# Patient Record
Sex: MALE | Race: WHITE | Employment: FULL TIME | URBAN - METROPOLITAN AREA
[De-identification: names, ages, dates, MRNs, and addresses within clinical notes are randomized per-mention and may not be internally consistent; named-entity substitution may affect disease eponyms.]

---

## 2019-01-02 ENCOUNTER — OFFICE VISIT (OUTPATIENT)
Dept: FAMILY MEDICINE CLINIC | Facility: CLINIC | Age: 49
End: 2019-01-02
Payer: COMMERCIAL

## 2019-01-02 VITALS
HEIGHT: 71.46 IN | TEMPERATURE: 97 F | HEART RATE: 69 BPM | SYSTOLIC BLOOD PRESSURE: 127 MMHG | WEIGHT: 196.63 LBS | DIASTOLIC BLOOD PRESSURE: 78 MMHG | BODY MASS INDEX: 26.93 KG/M2

## 2019-01-02 DIAGNOSIS — Z00.00 ANNUAL PHYSICAL EXAM: Primary | ICD-10-CM

## 2019-01-02 DIAGNOSIS — R06.83 SNORING: ICD-10-CM

## 2019-01-02 PROCEDURE — 90471 IMMUNIZATION ADMIN: CPT | Performed by: FAMILY MEDICINE

## 2019-01-02 PROCEDURE — 90686 IIV4 VACC NO PRSV 0.5 ML IM: CPT | Performed by: FAMILY MEDICINE

## 2019-01-02 PROCEDURE — 99396 PREV VISIT EST AGE 40-64: CPT | Performed by: FAMILY MEDICINE

## 2019-01-02 NOTE — PROGRESS NOTES
HPI:    Rosenda Freeman is a 50year old male for an annual physical exam.   Wife has been complaining that he snores more at night. Sleeps between 6-8 hours a night, denies daytime somnolence, feels well rested. Normal appetite, balanced diet.  Normal normal.   Mouth/Throat: Uvula is midline, oropharynx is clear and moist and mucous membranes are normal.   Eyes: Conjunctivae and EOM are normal. Pupils are equal, round, and reactive to light. Neck: Normal range of motion. Neck supple.  No thyromegaly pr

## 2019-01-03 LAB
ABSOLUTE BASOPHILS: 47 CELLS/UL (ref 0–200)
ABSOLUTE EOSINOPHILS: 189 CELLS/UL (ref 15–500)
ABSOLUTE LYMPHOCYTES: 1121 CELLS/UL (ref 850–3900)
ABSOLUTE MONOCYTES: 437 CELLS/UL (ref 200–950)
ABSOLUTE NEUTROPHILS: 4106 CELLS/UL (ref 1500–7800)
ALBUMIN/GLOBULIN RATIO: 2 (CALC) (ref 1–2.5)
ALBUMIN: 4.7 G/DL (ref 3.6–5.1)
ALKALINE PHOSPHATASE: 62 U/L (ref 40–115)
ALT: 21 U/L (ref 9–46)
AST: 14 U/L (ref 10–40)
BASOPHILS: 0.8 %
BILIRUBIN, TOTAL: 0.6 MG/DL (ref 0.2–1.2)
BUN: 17 MG/DL (ref 7–25)
CALCIUM: 9.4 MG/DL (ref 8.6–10.3)
CARBON DIOXIDE: 25 MMOL/L (ref 20–32)
CHLORIDE: 104 MMOL/L (ref 98–110)
CHOL/HDLC RATIO: 3.5 (CALC)
CHOLESTEROL, TOTAL: 173 MG/DL
CREATININE: 0.78 MG/DL (ref 0.6–1.35)
EGFR IF AFRICN AM: 124 ML/MIN/1.73M2
EGFR IF NONAFRICN AM: 107 ML/MIN/1.73M2
EOSINOPHILS: 3.2 %
GLOBULIN: 2.3 G/DL (CALC) (ref 1.9–3.7)
GLUCOSE: 85 MG/DL (ref 65–99)
HDL CHOLESTEROL: 50 MG/DL
HEMATOCRIT: 42.5 % (ref 38.5–50)
HEMOGLOBIN: 14.7 G/DL (ref 13.2–17.1)
LDL-CHOLESTEROL: 103 MG/DL (CALC)
LYMPHOCYTES: 19 %
MCH: 29.2 PG (ref 27–33)
MCHC: 34.6 G/DL (ref 32–36)
MCV: 84.5 FL (ref 80–100)
MONOCYTES: 7.4 %
MPV: 10.2 FL (ref 7.5–12.5)
NEUTROPHILS: 69.6 %
NON-HDL CHOLESTEROL: 123 MG/DL (CALC)
PLATELET COUNT: 306 THOUSAND/UL (ref 140–400)
POTASSIUM: 4 MMOL/L (ref 3.5–5.3)
PROTEIN, TOTAL: 7 G/DL (ref 6.1–8.1)
RDW: 12.5 % (ref 11–15)
RED BLOOD CELL COUNT: 5.03 MILLION/UL (ref 4.2–5.8)
SODIUM: 139 MMOL/L (ref 135–146)
TOTAL PSA: 1 NG/ML
TRIGLYCERIDES: 106 MG/DL
TSH W/REFLEX TO FT4: 1.69 MIU/L (ref 0.4–4.5)
WHITE BLOOD CELL COUNT: 5.9 THOUSAND/UL (ref 3.8–10.8)

## 2019-01-08 ENCOUNTER — PATIENT MESSAGE (OUTPATIENT)
Dept: FAMILY MEDICINE CLINIC | Facility: CLINIC | Age: 49
End: 2019-01-08

## 2019-01-08 DIAGNOSIS — R06.83 SNORING: Primary | ICD-10-CM

## 2019-01-09 NOTE — TELEPHONE ENCOUNTER
From: Luis Chavez  To: Gurvinder Castillo MD  Sent: 1/8/2019 5:40 PM CST  Subject: Test Results Question    Hi Dr Maren Carr,    I reviewed my test results and things seem to be in line with the ranges but to be honest I really have no idea what that means

## 2019-02-05 ENCOUNTER — TELEPHONE (OUTPATIENT)
Dept: FAMILY MEDICINE CLINIC | Facility: CLINIC | Age: 49
End: 2019-02-05

## 2019-02-05 DIAGNOSIS — R06.83 SNORING: Primary | ICD-10-CM

## 2019-02-05 NOTE — TELEPHONE ENCOUNTER
The request for an in-lab sleep study does not meet the medical necessity criteria of this patient's plan. As a result this request will be elevated to the Medical Director for possible denial unless you elect to change the service requested. The patient DOES meet medical necessity criteria for a Home Sleep Study. Please advise if you would be willing to change to a home sleep study.  If so please cancel in lab sleep study and place new order for home sleep study    Thank you, Barron

## 2019-02-05 NOTE — TELEPHONE ENCOUNTER
Mamie Evans,     I do not see the new order for the home sleep study. Please sign off on request, so that a new referral is generated.      Thank you, Barron

## 2019-02-15 NOTE — TELEPHONE ENCOUNTER
Dr Ricardo Manrique,     I am unable to see a new order for a home sleep study. Will you please create a new order that indicates home sleep study.      Thank you, Barron

## 2019-03-15 ENCOUNTER — MED REC SCAN ONLY (OUTPATIENT)
Dept: FAMILY MEDICINE CLINIC | Facility: CLINIC | Age: 49
End: 2019-03-15

## 2020-09-29 NOTE — H&P
[facility], [facility] - Gastroenterology                                                                                                               Reason for consult: crc screening    Requesting physician or provider: Afsaneh Marshall MD    Patient presents wi Alcohol use: Yes      Comment: 1-2 beers a week    Drug use: No       Medications (Active prior to today's visit):  Current Outpatient Medications   Medication Sig Dispense Refill   • PEG 3350-KCl-Na Bicarb-NaCl (TRILYTE) 420 g Oral Recon Soln Take prep as 7.5 - 12.5 fL 10.2    ABSOLUTE NEUTROPHILS   1,500 - 7,800 cells/uL 4,106    ABSOLUTE LYMPHOCYTES   850 - 3,900 cells/uL 1,121    ABSOLUTE MONOCYTES   200 - 950 cells/uL 437    ABSOLUTE EOSINOPHILS   15 - 500 cells/uL 189    ABSOLUTE BASOPHILS   0 - 200 carefully    5. AVOID seeds, nuts, popcorn, raw fruits and vegetables (cooked is okay) for 2-3 days before procedure    6. You will need to be tested for COVID within 72 hours of your procedure. You will be contacted with instructions on how to do this.

## 2020-10-01 ENCOUNTER — OFFICE VISIT (OUTPATIENT)
Dept: GASTROENTEROLOGY | Facility: CLINIC | Age: 50
End: 2020-10-01
Payer: COMMERCIAL

## 2020-10-01 ENCOUNTER — TELEPHONE (OUTPATIENT)
Dept: GASTROENTEROLOGY | Facility: CLINIC | Age: 50
End: 2020-10-01

## 2020-10-01 VITALS
DIASTOLIC BLOOD PRESSURE: 78 MMHG | HEART RATE: 93 BPM | SYSTOLIC BLOOD PRESSURE: 135 MMHG | HEIGHT: 71 IN | WEIGHT: 191 LBS | BODY MASS INDEX: 26.74 KG/M2

## 2020-10-01 DIAGNOSIS — Z12.11 COLON CANCER SCREENING: Primary | ICD-10-CM

## 2020-10-01 DIAGNOSIS — Z12.11 SPECIAL SCREENING FOR MALIGNANT NEOPLASMS, COLON: Primary | ICD-10-CM

## 2020-10-01 PROCEDURE — 99203 OFFICE O/P NEW LOW 30 MIN: CPT | Performed by: NURSE PRACTITIONER

## 2020-10-01 PROCEDURE — 3075F SYST BP GE 130 - 139MM HG: CPT | Performed by: NURSE PRACTITIONER

## 2020-10-01 PROCEDURE — 3008F BODY MASS INDEX DOCD: CPT | Performed by: NURSE PRACTITIONER

## 2020-10-01 PROCEDURE — 3078F DIAST BP <80 MM HG: CPT | Performed by: NURSE PRACTITIONER

## 2020-10-01 RX ORDER — POLYETHYLENE GLYCOL 3350, SODIUM CHLORIDE, SODIUM BICARBONATE, POTASSIUM CHLORIDE 420; 11.2; 5.72; 1.48 G/4L; G/4L; G/4L; G/4L
POWDER, FOR SOLUTION ORAL
Qty: 1 BOTTLE | Refills: 0 | Status: ON HOLD | OUTPATIENT
Start: 2020-10-01 | End: 2020-11-18

## 2020-10-01 NOTE — PATIENT INSTRUCTIONS
1. Schedule colonoscopy with conscious w/ Dr. Valery Valdivia [Diagnosis: crc screening]    2.  bowel prep from pharmacy (split trilyte)    3. Continue all medications for procedure    4. Read all bowel prep instructions carefully    5.  AVOID seeds, nuts, pop

## 2020-10-01 NOTE — TELEPHONE ENCOUNTER
1. Schedule colonoscopy with conscious w/ Dr. Berry Robles [Diagnosis: crc screening]    2.  bowel prep from pharmacy (split trilyte)    3. Continue all medications for procedure    4. Read all bowel prep instructions carefully    5.  AVOID seeds, nuts, pop

## 2020-10-06 ENCOUNTER — LAB ENCOUNTER (OUTPATIENT)
Dept: LAB | Age: 50
End: 2020-10-06
Attending: FAMILY MEDICINE
Payer: COMMERCIAL

## 2020-10-06 ENCOUNTER — OFFICE VISIT (OUTPATIENT)
Dept: FAMILY MEDICINE CLINIC | Facility: CLINIC | Age: 50
End: 2020-10-06
Payer: COMMERCIAL

## 2020-10-06 VITALS
DIASTOLIC BLOOD PRESSURE: 74 MMHG | SYSTOLIC BLOOD PRESSURE: 116 MMHG | BODY MASS INDEX: 26.6 KG/M2 | WEIGHT: 190 LBS | HEIGHT: 71 IN | HEART RATE: 97 BPM

## 2020-10-06 DIAGNOSIS — Z00.00 ANNUAL PHYSICAL EXAM: Primary | ICD-10-CM

## 2020-10-06 DIAGNOSIS — Z82.49 FAMILY HISTORY OF HEART DISEASE: ICD-10-CM

## 2020-10-06 PROCEDURE — 93000 ELECTROCARDIOGRAM COMPLETE: CPT | Performed by: FAMILY MEDICINE

## 2020-10-06 PROCEDURE — 3074F SYST BP LT 130 MM HG: CPT | Performed by: FAMILY MEDICINE

## 2020-10-06 PROCEDURE — 3008F BODY MASS INDEX DOCD: CPT | Performed by: FAMILY MEDICINE

## 2020-10-06 PROCEDURE — 90750 HZV VACC RECOMBINANT IM: CPT | Performed by: FAMILY MEDICINE

## 2020-10-06 PROCEDURE — 3078F DIAST BP <80 MM HG: CPT | Performed by: FAMILY MEDICINE

## 2020-10-06 PROCEDURE — 90471 IMMUNIZATION ADMIN: CPT | Performed by: FAMILY MEDICINE

## 2020-10-06 PROCEDURE — 90472 IMMUNIZATION ADMIN EACH ADD: CPT | Performed by: FAMILY MEDICINE

## 2020-10-06 PROCEDURE — 99396 PREV VISIT EST AGE 40-64: CPT | Performed by: FAMILY MEDICINE

## 2020-10-06 PROCEDURE — 36415 COLL VENOUS BLD VENIPUNCTURE: CPT

## 2020-10-06 PROCEDURE — 90686 IIV4 VACC NO PRSV 0.5 ML IM: CPT | Performed by: FAMILY MEDICINE

## 2020-10-06 NOTE — PROGRESS NOTES
HPI:    Paras Yancey is a 48year old male presents to clinic for an annual physical exam.  Patient is concerned-his mother passed from a heart attack last year, and his father was recently told that he previously had a heart attack.   His sister is u are negative. PHYSICAL EXAM:      10/06/20  0908   BP: 116/74   Pulse: 97   Weight: 190 lb (86.2 kg)   Height: 5' 11\" (1.803 m)     Physical Exam   Constitutional: No distress. HENT:   Head: Normocephalic and atraumatic.    Right Ear: Tympanic membr Encounter      CBC W Differential W Platelet [E]      COMP METABOLIC PANEL [07103] [Q]      TSH W REFLEX TO FREE T4 [42900][Q]      LIPID PANEL [3565] [Q]      Flulaval 6 months and older 0.5 ml PFS [15417]      Zoster Recombinant Adjuvanted [Shingrix -Jenene Shear

## 2020-10-14 NOTE — TELEPHONE ENCOUNTER
Scheduled for:  Colonoscopy 10927  Provider Name:  Dr. Cat De León  Date:  12/15/2020  Location:  [facility]  Sedation:  IV   Time:  1:15PM (pt is aware to arrive at 12:15PM)    Prep:  Split Trilyte Prep instructions were given to pt over the phone, pt verbalized under

## 2020-10-22 ENCOUNTER — TELEPHONE (OUTPATIENT)
Dept: GASTROENTEROLOGY | Facility: CLINIC | Age: 50
End: 2020-10-22

## 2020-10-22 DIAGNOSIS — Z12.11 COLON CANCER SCREENING: Primary | ICD-10-CM

## 2020-10-23 NOTE — TELEPHONE ENCOUNTER
Rescheduled for:  Colonoscopy 47599  Provider Name:  Dr. Guerline Kraus  Date:    From-12/15/20 To-11/18/20  Location:    [facility]  Sedation:  IV  Time:    [id number]  SO-2900 (pt is aware to arrive at 1345)   Prep:  Trilyte, sent via Mychart  Meds/Allergies Reconciled?:

## 2020-11-15 ENCOUNTER — APPOINTMENT (OUTPATIENT)
Dept: LAB | Facility: HOSPITAL | Age: 50
End: 2020-11-15
Attending: INTERNAL MEDICINE
Payer: COMMERCIAL

## 2020-11-15 DIAGNOSIS — Z01.818 PRE-OP TESTING: ICD-10-CM

## 2020-11-18 ENCOUNTER — HOSPITAL ENCOUNTER (OUTPATIENT)
Facility: HOSPITAL | Age: 50
Setting detail: HOSPITAL OUTPATIENT SURGERY
Discharge: HOME | End: 2020-11-18
Attending: INTERNAL MEDICINE | Admitting: INTERNAL MEDICINE
Payer: COMMERCIAL

## 2020-11-18 VITALS
TEMPERATURE: 98 F | WEIGHT: 193 LBS | OXYGEN SATURATION: 97 % | HEIGHT: 72 IN | SYSTOLIC BLOOD PRESSURE: 101 MMHG | DIASTOLIC BLOOD PRESSURE: 84 MMHG | BODY MASS INDEX: 26.14 KG/M2 | RESPIRATION RATE: 18 BRPM | HEART RATE: 94 BPM

## 2020-11-18 DIAGNOSIS — Z01.818 PRE-OP TESTING: Primary | ICD-10-CM

## 2020-11-18 DIAGNOSIS — Z12.11 SPECIAL SCREENING FOR MALIGNANT NEOPLASMS, COLON: ICD-10-CM

## 2020-11-18 PROCEDURE — 45385 COLONOSCOPY W/LESION REMOVAL: CPT | Performed by: INTERNAL MEDICINE

## 2020-11-18 PROCEDURE — 0DBP8ZX EXCISION OF RECTUM, VIA NATURAL OR ARTIFICIAL OPENING ENDOSCOPIC, DIAGNOSTIC: ICD-10-PCS | Performed by: INTERNAL MEDICINE

## 2020-11-18 PROCEDURE — 0DBN8ZX EXCISION OF SIGMOID COLON, VIA NATURAL OR ARTIFICIAL OPENING ENDOSCOPIC, DIAGNOSTIC: ICD-10-PCS | Performed by: INTERNAL MEDICINE

## 2020-11-18 PROCEDURE — 0DBK8ZX EXCISION OF ASCENDING COLON, VIA NATURAL OR ARTIFICIAL OPENING ENDOSCOPIC, DIAGNOSTIC: ICD-10-PCS | Performed by: INTERNAL MEDICINE

## 2020-11-18 PROCEDURE — G0500 MOD SEDAT ENDO SERVICE >5YRS: HCPCS | Performed by: INTERNAL MEDICINE

## 2020-11-18 PROCEDURE — 45380 COLONOSCOPY AND BIOPSY: CPT | Performed by: INTERNAL MEDICINE

## 2020-11-18 RX ORDER — SODIUM CHLORIDE, SODIUM LACTATE, POTASSIUM CHLORIDE, CALCIUM CHLORIDE 600; 310; 30; 20 MG/100ML; MG/100ML; MG/100ML; MG/100ML
INJECTION, SOLUTION INTRAVENOUS CONTINUOUS
Status: DISCONTINUED | OUTPATIENT
Start: 2020-11-18 | End: 2020-11-18

## 2020-11-18 RX ORDER — SODIUM CHLORIDE 0.9 % (FLUSH) 0.9 %
10 SYRINGE (ML) INJECTION AS NEEDED
Status: DISCONTINUED | OUTPATIENT
Start: 2020-11-18 | End: 2020-11-18

## 2020-11-18 RX ORDER — MIDAZOLAM HYDROCHLORIDE 1 MG/ML
INJECTION INTRAMUSCULAR; INTRAVENOUS
Status: DISCONTINUED | OUTPATIENT
Start: 2020-11-18 | End: 2020-11-18

## 2020-11-18 RX ORDER — MIDAZOLAM HYDROCHLORIDE 1 MG/ML
1 INJECTION INTRAMUSCULAR; INTRAVENOUS EVERY 5 MIN PRN
Status: DISCONTINUED | OUTPATIENT
Start: 2020-11-18 | End: 2020-11-18

## 2020-11-18 NOTE — H&P
History & Physical Examination    Patient Name: Rhys Castillo  MRN: [id number]  CSN: [id number]  YOB: 1970    Diagnosis: screening for colon cancer      •  PEG 3350-KCl-Na Bicarb-NaCl (TRILYTE) 420 g Oral Recon Soln, Take prep as directed Physical done within the last 30 days. Any changes noted above.     Fabian Hairston, [address] [place] [address] - Gastroenterology  11/18/2020  3:04 PM

## 2020-12-01 ENCOUNTER — TELEPHONE (OUTPATIENT)
Dept: GASTROENTEROLOGY | Facility: CLINIC | Age: 50
End: 2020-12-01

## 2020-12-01 NOTE — TELEPHONE ENCOUNTER
I mailed out colonoscopy results letter to pt  Updated health maintenance  Entered into 3 yr CLN recall  Recall colon in 3 years per.  Colon done 11/18/2020          GI staff: please place recall for colonoscopy in 3 years

## 2021-12-01 ENCOUNTER — LAB ENCOUNTER (OUTPATIENT)
Dept: LAB | Age: 51
End: 2021-12-01
Attending: FAMILY MEDICINE
Payer: COMMERCIAL

## 2021-12-01 ENCOUNTER — OFFICE VISIT (OUTPATIENT)
Dept: FAMILY MEDICINE CLINIC | Facility: CLINIC | Age: 51
End: 2021-12-01
Payer: COMMERCIAL

## 2021-12-01 VITALS
DIASTOLIC BLOOD PRESSURE: 78 MMHG | BODY MASS INDEX: 27.09 KG/M2 | SYSTOLIC BLOOD PRESSURE: 124 MMHG | HEART RATE: 79 BPM | WEIGHT: 200 LBS | HEIGHT: 72 IN | TEMPERATURE: 98 F

## 2021-12-01 DIAGNOSIS — M75.02 ADHESIVE CAPSULITIS OF LEFT SHOULDER: ICD-10-CM

## 2021-12-01 DIAGNOSIS — Z00.00 ANNUAL PHYSICAL EXAM: Primary | ICD-10-CM

## 2021-12-01 PROCEDURE — 3078F DIAST BP <80 MM HG: CPT | Performed by: FAMILY MEDICINE

## 2021-12-01 PROCEDURE — 90715 TDAP VACCINE 7 YRS/> IM: CPT | Performed by: FAMILY MEDICINE

## 2021-12-01 PROCEDURE — 3008F BODY MASS INDEX DOCD: CPT | Performed by: FAMILY MEDICINE

## 2021-12-01 PROCEDURE — 99396 PREV VISIT EST AGE 40-64: CPT | Performed by: FAMILY MEDICINE

## 2021-12-01 PROCEDURE — 90686 IIV4 VACC NO PRSV 0.5 ML IM: CPT | Performed by: FAMILY MEDICINE

## 2021-12-01 PROCEDURE — 90750 HZV VACC RECOMBINANT IM: CPT | Performed by: FAMILY MEDICINE

## 2021-12-01 PROCEDURE — 3074F SYST BP LT 130 MM HG: CPT | Performed by: FAMILY MEDICINE

## 2021-12-01 PROCEDURE — 90471 IMMUNIZATION ADMIN: CPT | Performed by: FAMILY MEDICINE

## 2021-12-01 PROCEDURE — 90472 IMMUNIZATION ADMIN EACH ADD: CPT | Performed by: FAMILY MEDICINE

## 2021-12-01 NOTE — PROGRESS NOTES
HPI:    Matt Cutler is a 46year old male presents to clinic for annual physical exam.  No acute concerns. Sent for the past several months, patient has reported some left-sided shoulder pain that radiates in his arm.   Also feels he has limited ran and are negative. PHYSICAL EXAM:      12/01/21  1300   BP: 124/78   Pulse: 79   Temp: 97.6 °F (36.4 °C)   TempSrc: Temporal   Weight: 200 lb (90.7 kg)   Height: 6' (1.829 m)     Physical Exam  Vitals reviewed.    Constitutional:       General: He is no learning observed.           Orders This Visit:  Orders Placed This Encounter      CBC W Differential W Platelet [E]      Comp Metabolic Panel (14) [E]      TSH W Reflex To Free T4 [E]      LIPID PANEL [7600] [Q]      PSA, TOTAL W REFLEX TO PSA, FREE [85986

## 2022-01-15 LAB — AMB EXT COVID-19 RESULT: DETECTED

## 2022-01-19 ENCOUNTER — PATIENT MESSAGE (OUTPATIENT)
Dept: FAMILY MEDICINE CLINIC | Facility: CLINIC | Age: 52
End: 2022-01-19

## 2022-01-19 ENCOUNTER — TELEPHONE (OUTPATIENT)
Dept: FAMILY MEDICINE CLINIC | Facility: CLINIC | Age: 52
End: 2022-01-19

## 2022-01-19 NOTE — TELEPHONE ENCOUNTER
From: Jamie Bob  To:  Rome Cutler MD  Sent: 1/19/2022 1:52 PM CST  Subject: Positive result via [facility] test    Hi Dr Tiffany Williamson,    I took the first part of the [facility] [place] [facility] COVID-19 Test on the morning of Friday, 14 Jan. I d

## 2022-01-19 NOTE — TELEPHONE ENCOUNTER
See Acute Telephone Encounter. Closing this Encounter. Responded to patient via [employer]

## 2022-01-19 NOTE — TELEPHONE ENCOUNTER
----- Message from Brandt Campos RN sent at 1/19/2022  2:18 PM CST -----  Regarding: FW: Positive result via [facility] test      ----- Message -----  From: Kee Batmean  Sent: 1/19/2022   1:52 PM CST  To: Em Rn Triage  Subject: Positive r

## 2022-12-12 ENCOUNTER — OFFICE VISIT (OUTPATIENT)
Dept: FAMILY MEDICINE CLINIC | Facility: CLINIC | Age: 52
End: 2022-12-12
Payer: COMMERCIAL

## 2022-12-12 ENCOUNTER — LAB ENCOUNTER (OUTPATIENT)
Dept: LAB | Age: 52
End: 2022-12-12
Attending: FAMILY MEDICINE
Payer: COMMERCIAL

## 2022-12-12 VITALS
OXYGEN SATURATION: 99 % | HEART RATE: 93 BPM | HEIGHT: 72 IN | BODY MASS INDEX: 25.73 KG/M2 | DIASTOLIC BLOOD PRESSURE: 66 MMHG | SYSTOLIC BLOOD PRESSURE: 112 MMHG | TEMPERATURE: 97 F | WEIGHT: 190 LBS

## 2022-12-12 DIAGNOSIS — Z00.00 ANNUAL PHYSICAL EXAM: Primary | ICD-10-CM

## 2022-12-12 DIAGNOSIS — Z23 FLU VACCINE NEED: ICD-10-CM

## 2022-12-12 PROCEDURE — 3074F SYST BP LT 130 MM HG: CPT | Performed by: FAMILY MEDICINE

## 2022-12-12 PROCEDURE — 90471 IMMUNIZATION ADMIN: CPT | Performed by: FAMILY MEDICINE

## 2022-12-12 PROCEDURE — 90686 IIV4 VACC NO PRSV 0.5 ML IM: CPT | Performed by: FAMILY MEDICINE

## 2022-12-12 PROCEDURE — 3078F DIAST BP <80 MM HG: CPT | Performed by: FAMILY MEDICINE

## 2022-12-12 PROCEDURE — 99396 PREV VISIT EST AGE 40-64: CPT | Performed by: FAMILY MEDICINE

## 2022-12-12 PROCEDURE — 3008F BODY MASS INDEX DOCD: CPT | Performed by: FAMILY MEDICINE

## 2022-12-13 LAB
BUN: 13 MG/DL (ref 7–25)
CALCIUM: 9.5 MG/DL (ref 8.6–10.3)
CARBON DIOXIDE: 27 MMOL/L (ref 20–32)
CHLORIDE: 104 MMOL/L (ref 98–110)
CHOL/HDLC RATIO: 2.8 (CALC)
CHOLESTEROL, TOTAL: 182 MG/DL
CREATININE: 0.87 MG/DL (ref 0.7–1.3)
EGFR: 104 ML/MIN/1.73M2
GLUCOSE: 90 MG/DL (ref 65–99)
HDL CHOLESTEROL: 64 MG/DL
LDL-CHOLESTEROL: 101 MG/DL (CALC)
NON-HDL CHOLESTEROL: 118 MG/DL (CALC)
POTASSIUM: 3.9 MMOL/L (ref 3.5–5.3)
SODIUM: 141 MMOL/L (ref 135–146)
TRIGLYCERIDES: 82 MG/DL

## 2023-08-02 ENCOUNTER — TELEPHONE (OUTPATIENT)
Facility: CLINIC | Age: 53
End: 2023-08-02

## 2023-08-02 NOTE — TELEPHONE ENCOUNTER
Patient outreach message received:    Entered into 3 yr CLN recall  Recall colon in 3 years per. Colon done 11/18/2020    Recall reminder letter mailed out to patient.

## 2023-08-23 ENCOUNTER — TELEPHONE (OUTPATIENT)
Facility: CLINIC | Age: 53
End: 2023-08-23

## 2023-08-23 DIAGNOSIS — Z86.010 PERSONAL HISTORY OF COLONIC POLYPS: Primary | ICD-10-CM

## 2023-08-23 NOTE — TELEPHONE ENCOUNTER
Kinza Crews MD   Physician  Gastroenterology     Operative Report     Signed     Date of Service: 2020  3:38 PM  Case Time: Procedures: Surgeons:   2020  3:07 PM COLONOSCOPY    Kinza Crews MD               Signed         COLONOSCOPY REPORT           Marty Bob      3/15/1970 Age 48year old   PCP Randy Junior MD Endoscopist Ian Garcia MD      Date of procedure: 20     Procedure: Colonoscopy w/cold biopsy, cold snare polypectomy, control of bleeding     Pre-operative diagnosis: Screening     Post-operative diagnosis: Colon polyps x3, internal hemorrhoids     Medications: Versed 8 mg IV push. Fentanyl 100 mcg IV push. [Per my order and under my supervision, the patient was sedated with intermittent intravenous doses of versed and fentanyl. The vital signs were monitored and recorded by an experienced RN. The procedure started after the patient was adequately sedated. Total time for moderate conscious sedation was 29 minutes]. Withdrawal time: 24 minutes     Procedure:  Informed consent was obtained from the patient after the risks of the procedure were discussed, including but not limited to bleeding, perforation, aspiration, infection, or possibility of a missed lesion. After discussions of the risks/benefits and alternatives to this procedure, as well as the planned sedation, the patient was placed in the left lateral decubitus position and begun on continuous blood pressure pulse oximetry and EKG monitoring and this was maintained throughout the procedure. Once an adequate level of sedation was obtained a digital rectal exam was completed. Then the lubricated tip of the [id number] diagnostic video colonoscope was inserted and advanced without difficulty to the cecum using the CO2 insufflation technique. The cecum was identified by localizing the trifold, the appendix and the ileocecal valve.  Withdrawal was begun with thorough washing and careful examination of the colonic walls and folds. A routine second examination of the cecum/ascending colon was performed. Photodocumentation was obtained. The bowel prep was good. Views of the colon were good with washing. I then carefully withdrew the instrument from the patient who tolerated the procedure well. Complications: none. Findings:   1. Three polyp(s) noted as follows:      A. 3 mm polyp in the ascending colon; flat morphology; cold biopsy polypectomy and retrieved. B. 10 mm polyp in the sigmoid colon; sessile morphology; cold snare polypectomy and retrieved. Persistent oozing from polypectomy site controlled with a single endoclip placement. C. 8 mm polyp in the rectum of the colon; sessile morphology; cold snare polypectomy and retrieved. 2. Diverticulosis: none. 3. Terminal ileum: the visualized mucosa appeared normal.     4. A retroflexed view of the rectum revealed small internal hemorrhoids. 5. The colonic mucosa throughout the colon showed normal vascular pattern, without evidence of angioectasias or inflammation. 6. SABRINA: normal rectal tone, no masses palpated. Impression: Three polyps removed, the largest is 10mm in size. Small internal hemorrhoids. Recommend:  Await pathology. The interval for the next colonoscopy will be determined after reviewing pathology, likely 3 years. If new signs or symptoms develop, colonoscopy may need to be repeated sooner. High fiber diet. Monitor for blood in the stool. If having more than just tinge of blood, call office or go to the ER. Reduce red meat intake to one serving a week or less. More fruits/vegetables. Avoid NSAIDS like ibuprofen x 2 weeks.      >>>If tissue was obtained and you have not received your pathology results either by phone or letter within 2 weeks, please call our office at [phone].      Specimens: colon      Blood loss: <1 ml            Electronically signed by Rishi Hopkins MD at 11/18/2020  3:39 PM  ============================================================  Final Diagnosis:      A. Ascending colon polyp:   Fragments of colonic mucosa with focal hyperplastic change. B. Rectosigmoid colon polyp x2:   Fragments of tubular adenoma.       Electronically signed by Donnajean Severe, MD on 11/19/2020 at 12:07 P

## 2023-08-23 NOTE — TELEPHONE ENCOUNTER
Last Procedure, Date, MD:  11/18/2020, colonoscopy, Dr Devi Corbin  Last Diagnosis:  tubular adenoma  Recalled (mth/yrs): 3 years  Sedation Used Previously:  IV  Last Prep Used (if known):  Trilyte  Quality Of Prep (if known): good with washing  Anticoagulants: no  Diuretics: no  Diabetic Med's (PO/Injectables): no  Weight loss Med's: no  Iron/Herbal/Multivitamin Supplements (RX/OTC): no  Marijuana/Vaping/CBD: no  Height & Weight: 6'/190  BMI: 25.77  Hx of Cardiac/CVA Issues/(MI/Stroke): no  Devices Pacemaker/Defibrillator/Stents:no  Respiratory Issues/Oxygen Use/FE/COPD: no  Issues w/ Anesthesia: no    Symptoms (Y/N): no  Symptoms Details: N/A    Special Comments/Notes: last annual with Dr Cris Topete 12/12/2022. Medications pharmacy and allergies verified with the pt    Please advise on orders and prep. Thank you!

## 2023-08-24 RX ORDER — POLYETHYLENE GLYCOL 3350, SODIUM CHLORIDE, SODIUM BICARBONATE, POTASSIUM CHLORIDE 420; 11.2; 5.72; 1.48 G/4L; G/4L; G/4L; G/4L
POWDER, FOR SOLUTION ORAL
Qty: 4000 ML | Refills: 0 | Status: SHIPPED | OUTPATIENT
Start: 2023-08-24

## 2023-08-24 NOTE — TELEPHONE ENCOUNTER
Scheduling:  cln w/ iv or mac w/ Dr. Goncalves Notch  Dx: ta of colon   trilyte split dose sent e-scribe

## 2023-08-24 NOTE — TELEPHONE ENCOUNTER
Scheduled for:  Colonoscopy-screen 52838    Provider Name:  Dr. Lorna Hoang   Date:  Friday, 12/8/2023  Location:  [facility]  Sedation:  MAC  Time:  11:15 AM Patient made aware [facility] will call the day before with an arrival time. Prep:  Split dose trilyte E-Prescribing Status: Receipt confirmed by pharmacy (8/24/2023  9:47 AM CDT)   Meds/Allergies Reconciled?:  Debbie Sylvester reviewed  Diagnosis with codes:  Colon polyps Z86.010  Was patient informed to call insurance with codes (Y/N): I confirmed Dahu with this patient. Referral sent?:  Referral was sent at the time of electronic surgical scheduling. [facility] or [address] notified?:  I sent an electronic request to Endo Scheduling and received a confirmation today. Medication Orders:  n/a  Misc Orders:  Patient was informed about the new cancellation policy for his/her procedure. Patient was also given a copy of the cancellation policy at the time of the appointment and verbalized understanding. Further instructions given by staff:  I discussed the prep instructions with the patient which he verbally understood and is aware that I will send the instructions today via [address] No

## 2023-10-27 ENCOUNTER — NURSE TRIAGE (OUTPATIENT)
Dept: FAMILY MEDICINE CLINIC | Facility: CLINIC | Age: 53
End: 2023-10-27

## 2023-10-27 LAB — AMB EXT COVID-19 RESULT: DETECTED

## 2023-10-27 NOTE — TELEPHONE ENCOUNTER
Action Requested: Summary for Provider     []  Critical Lab, Recommendations Needed  [] Need Additional Advice  [x]   FYI    []   Need Orders  [] Need Medications Sent to Pharmacy  []  Other     SUMMARY: Patient stated that he started with symptoms on Tuesday 10/24/2023 and tested positive for COVID today(chart updated). Symptoms: low grade fever, body aches, fatigue, alittle chest congestion. Not short of breath or wheezing. No chest pain. No other symptoms. Went over the following information with patient:     Patient's that test positive for COVID, per CDC guidelines, should quarantine- self isolate from others for the first 5 days from the onset of your symptoms, days 6-10 you can be around others as long as you are fever free for 24 hours without any tylenol/ibuprofen and your symptoms are improving, but you do have to wear a mask around others. Retesting is not recommended, since some patient's can still test positive for COVID up to 90 days from the initial infection, but are not considered contagious once they complete their quarantine period. Please wash hands frequently and sanitize common surfaces to prevent the spread of COVID to others in your home. Anyone you have come into contact with that does not have any symptoms should wear a mask around others and wait at least 5-7 days from the date of last exposure to you to get tested for COVID to prevent a false negative result. You can treat symptoms at home by drinking plenty of fluids, eating regularly, doing deep breathing exercises, salt water gargles/throat lozenges for any phlegm or for a sore throat, monitoring your temperature, ibuprofen/tylenol for pain/fevers, using a humidifier, steaming in the shower, and getting plenty of rest at night.  There are medications available for patient's that test positive for COVID that help lessen the severity of symptoms related to COVID, but should be administered within the first 5 days from the onset of symptoms: Paxlovid(oral medication) If any chest pain, wheezing, or shortness of breath please go to urgent care/emergency room for an evaluation. Patient agreed. Will follow home care for now.    Reason for call: Covid  Onset: Oct 24, 2023  Reason for Disposition   [1] COVID-19 diagnosed by doctor (or NP/PA) AND [2] mild symptoms (e.g., cough, fever, others) AND [4] no complications or SOB    Protocols used: Coronavirus (LFYEU-11) Diagnosed or [id number]

## 2023-12-01 ENCOUNTER — LAB ENCOUNTER (OUTPATIENT)
Dept: LAB | Age: 53
End: 2023-12-01
Attending: FAMILY MEDICINE
Payer: COMMERCIAL

## 2023-12-01 ENCOUNTER — OFFICE VISIT (OUTPATIENT)
Dept: FAMILY MEDICINE CLINIC | Facility: CLINIC | Age: 53
End: 2023-12-01
Payer: COMMERCIAL

## 2023-12-01 VITALS
WEIGHT: 199 LBS | SYSTOLIC BLOOD PRESSURE: 117 MMHG | OXYGEN SATURATION: 98 % | BODY MASS INDEX: 26.95 KG/M2 | HEART RATE: 71 BPM | DIASTOLIC BLOOD PRESSURE: 77 MMHG | RESPIRATION RATE: 18 BRPM | HEIGHT: 72 IN

## 2023-12-01 DIAGNOSIS — Z00.00 ANNUAL PHYSICAL EXAM: Primary | ICD-10-CM

## 2023-12-01 PROCEDURE — 3008F BODY MASS INDEX DOCD: CPT | Performed by: FAMILY MEDICINE

## 2023-12-01 PROCEDURE — 90471 IMMUNIZATION ADMIN: CPT | Performed by: FAMILY MEDICINE

## 2023-12-01 PROCEDURE — 3078F DIAST BP <80 MM HG: CPT | Performed by: FAMILY MEDICINE

## 2023-12-01 PROCEDURE — 3074F SYST BP LT 130 MM HG: CPT | Performed by: FAMILY MEDICINE

## 2023-12-01 PROCEDURE — 90686 IIV4 VACC NO PRSV 0.5 ML IM: CPT | Performed by: FAMILY MEDICINE

## 2023-12-01 PROCEDURE — 99396 PREV VISIT EST AGE 40-64: CPT | Performed by: FAMILY MEDICINE

## 2023-12-01 NOTE — PROGRESS NOTES
HPI:    Wai Gonzalez is a 48year old male presents to clinic for annual physical exam.  No acute concerns or major changes in health. Normal appetite, balanced diet. Does not exercise, plans to start. Normal bowel movements and urination. Normal sleep habits. HISTORY:  Past Medical History:   Diagnosis Date    Allergic rhinitis     Have had this since childhood    Headache     Tubular adenoma of colon 2020    repeat CLN in 2023    Visual impairment     Been wearing corrective lenses since 3rd grade. Past Surgical History:   Procedure Laterality Date    COLONOSCOPY N/A 11/18/2020    Procedure: COLONOSCOPY;  Surgeon: Aury Figueroa MD;  Location: [address] [place] [address] ENDOSCOPY    T&A  1977    T+A surgeries    TONSILLECTOMY        Family History   Problem Relation Age of Onset    Lipids Father         elevated cholesterol    Genito-Urinary Disorder Father         BPH    Heart Disorder Father     Lipids Mother         elevated cholesterol    Heart Disorder Mother     Asthma Brother     Asthma Brother       Social History:   Social History     Socioeconomic History    Marital status:    Tobacco Use    Smoking status: Never    Smokeless tobacco: Never   Vaping Use    Vaping Use: Never used   Substance and Sexual Activity    Alcohol use: Yes     Comment: 1-2 beers a week    Drug use: No   Other Topics Concern    Caffeine Concern No    Second-hand smoke exposure Yes     Comment: Grew up in house with Mom and sisters smoking    Alcohol/drug concerns No    Violence concerns No        Medications (Active prior to today's visit):  Current Outpatient Medications   Medication Sig Dispense Refill    PEG 3350-KCl-Na Bicarb-NaCl (TRILYTE) 420 g Oral Recon Soln Take prep as directed by gastro office. May substitute with Trilyte/generic equivalent if needed. 4000 mL 0       Allergies:   Allergies   Allergen Reactions    Seasonal Coughing and Runny nose         Depression Screening (PHQ-2/PHQ-9): Over the LAST 2 WEEKS Little interest or pleasure in doing things: Not at all    Feeling down, depressed, or hopeless: Not at all    PHQ-2 SCORE: 0           ROS:   Review of Systems   All other systems reviewed and are negative. PHYSICAL EXAM:     Vitals:    12/01/23 1259   BP: 117/77   BP Location: Left arm   Patient Position: Sitting   Cuff Size: adult   Pulse: 71   Resp: 18   SpO2: 98%   Weight: 199 lb (90.3 kg)   Height: 6' (1.829 m)     Physical Exam  Vitals reviewed. Constitutional:       General: He is not in acute distress. HENT:      Head: Normocephalic and atraumatic. Right Ear: Tympanic membrane, ear canal and external ear normal.      Left Ear: Tympanic membrane, ear canal and external ear normal.      Nose: Nose normal.      Mouth/Throat:      Pharynx: Uvula midline. Eyes:      Conjunctiva/sclera: Conjunctivae normal.      Pupils: Pupils are equal, round, and reactive to light. Neck:      Thyroid: No thyromegaly. Cardiovascular:      Rate and Rhythm: Normal rate and regular rhythm. Heart sounds: Normal heart sounds. No murmur heard. Pulmonary:      Effort: Pulmonary effort is normal. No respiratory distress. Breath sounds: Normal breath sounds. No wheezing or rales. Abdominal:      General: Bowel sounds are normal. There is no distension. Palpations: Abdomen is soft. Tenderness: There is no abdominal tenderness. There is no guarding or rebound. Musculoskeletal:      Cervical back: Normal range of motion and neck supple. Lymphadenopathy:      Cervical: No cervical adenopathy. Neurological:      Mental Status: He is alert. ASSESSMENT/PLAN:   (Z00.00) Annual physical exam  (primary encounter diagnosis)  Plan: Lipid Panel [E], Comp Metabolic Panel (14) [E],        TSH W Reflex To Free T4 [E], Fluzone         Quadrivalent 6mo+ 0.5mL, PSA, TOTAL [5363] [Q]  - Flu vaccine given, otherwise up-to-date  - Reinforced healthy diet, lifestyle, and exercise.   - Past Medical/Social/Family histories reviewed  - Regular dental visits recommended   - Regular eye exams recommended       Follow up in 1 year or sooner if needed               Responsible party/patient verbalized understanding of information discussed. No barriers to learning observed. Orders This Visit:  Orders Placed This Encounter   Procedures    Lipid Panel [E]    Comp Metabolic Panel (14) [E]    TSH W Reflex To Free T4 [E]    PSA, TOTAL [5363] [Q]    Fluzone Quadrivalent 6mo+ 0.5mL       Meds This Visit:  Requested Prescriptions      No prescriptions requested or ordered in this encounter       Imaging & Referrals:  INFLUENZA VACCINE, QUAD, PRESERVATIVE FREE, 0.5 ML       The 21st Century cures Act makes medical notes like these available to patients in the interest of transparency. However, be advised that this is a medical document. It is intended as peer to peer communication. It is written in medical language and may contain abbreviations or verbiage that are unfamiliar. It may appear blunt or direct. Medical documents are intended to carry relevant information, facts as evident, and the clinical opinion of the practitioner. This note was created by [employer] voice recognition. Errors in content may be related to improper recognition by the system; efforts to review and correct have been done but errors may still exist. Please contact me with any questions.        12/1/2023  Horacio Cano MD

## 2023-12-02 LAB
ALBUMIN/GLOBULIN RATIO: 2.1 (CALC) (ref 1–2.5)
ALBUMIN: 4.5 G/DL (ref 3.6–5.1)
ALKALINE PHOSPHATASE: 63 U/L (ref 35–144)
ALT: 49 U/L (ref 9–46)
AST: 25 U/L (ref 10–35)
BILIRUBIN, TOTAL: 0.6 MG/DL (ref 0.2–1.2)
BUN/CREATININE RATIO: 8 (CALC) (ref 6–22)
BUN: 13 MG/DL (ref 7–25)
CALCIUM: 9 MG/DL (ref 8.6–10.3)
CARBON DIOXIDE: 23 MMOL/L (ref 20–32)
CHLORIDE: 106 MMOL/L (ref 98–110)
CHOL/HDLC RATIO: 2.9 (CALC)
CHOLESTEROL, TOTAL: 186 MG/DL
CREATININE: 1.66 MG/DL (ref 0.7–1.3)
EGFR: 49 ML/MIN/1.73M2
GLOBULIN: 2.1 G/DL (CALC) (ref 1.9–3.7)
GLUCOSE: 92 MG/DL (ref 65–99)
HDL CHOLESTEROL: 64 MG/DL
LDL-CHOLESTEROL: 100 MG/DL (CALC)
NON-HDL CHOLESTEROL: 122 MG/DL (CALC)
POTASSIUM: 3.9 MMOL/L (ref 3.5–5.3)
PROTEIN, TOTAL: 6.6 G/DL (ref 6.1–8.1)
PSA, TOTAL: 2.62 NG/ML
SODIUM: 141 MMOL/L (ref 135–146)
TRIGLYCERIDES: 119 MG/DL
TSH W/REFLEX TO FT4: 2.98 MIU/L (ref 0.4–4.5)

## 2023-12-08 PROBLEM — K64.8 INTERNAL HEMORRHOIDS: Status: ACTIVE | Noted: 2023-12-08

## 2023-12-08 PROBLEM — K57.30 DIVERTICULA OF COLON: Status: ACTIVE | Noted: 2023-12-08

## 2023-12-08 PROCEDURE — 88305 TISSUE EXAM BY PATHOLOGIST: CPT | Performed by: INTERNAL MEDICINE

## 2023-12-20 ENCOUNTER — TELEPHONE (OUTPATIENT)
Dept: GASTROENTEROLOGY | Facility: CLINIC | Age: 53
End: 2023-12-20

## 2023-12-20 NOTE — TELEPHONE ENCOUNTER
Results letter mailed to patient per   Colon recall entered for repeat in 12/8/2030  Colon done in 12/8/2023  HM Updated and Patient Outreach was placed for Colon recall   Stacie Starkey MD  P Em Gi Clinical Staff  GI staff: please place recall for colonoscopy in 7 years.

## 2024-02-05 ENCOUNTER — OFFICE VISIT (OUTPATIENT)
Dept: FAMILY MEDICINE CLINIC | Facility: CLINIC | Age: 54
End: 2024-02-05
Payer: COMMERCIAL

## 2024-02-05 ENCOUNTER — LAB ENCOUNTER (OUTPATIENT)
Dept: LAB | Age: 54
End: 2024-02-05
Attending: FAMILY MEDICINE
Payer: COMMERCIAL

## 2024-02-05 VITALS
DIASTOLIC BLOOD PRESSURE: 80 MMHG | HEIGHT: 72 IN | SYSTOLIC BLOOD PRESSURE: 130 MMHG | WEIGHT: 196 LBS | HEART RATE: 80 BPM | RESPIRATION RATE: 19 BRPM | OXYGEN SATURATION: 98 % | BODY MASS INDEX: 26.55 KG/M2

## 2024-02-05 DIAGNOSIS — N28.9 RENAL INSUFFICIENCY: Primary | ICD-10-CM

## 2024-02-05 PROCEDURE — 99213 OFFICE O/P EST LOW 20 MIN: CPT | Performed by: FAMILY MEDICINE

## 2024-02-05 NOTE — H&P
HPI:    Luis Enrique Bob is a 53 year old male presents clinic for follow-up.  After last physical, was told kidney function had declined.  Patient has since started to drink more water.  Does not typically take ibuprofen or naproxen.  No other changes in medications/supplements.  Normal urination    HISTORY:  Past Medical History:   Diagnosis Date    Allergic rhinitis     Have had this since childhood    Headache     Tubular adenoma of colon 2023    repeat CLN in 2030    Visual impairment     Been wearing corrective lenses since 3rd grade.      Past Surgical History:   Procedure Laterality Date    COLONOSCOPY N/A 11/18/2020    Procedure: COLONOSCOPY;  Surgeon: LORY Benjamin MD;  Location: [facility] ENDOSCOPY    COLONOSCOPY N/A 12/8/2023    Procedure: COLONOSCOPY;  Surgeon: LORY Benjamin MD;  Location: [facility] MAIN OR    T&A  1977    T+A surgeries    TONSILLECTOMY        Family History   Problem Relation Age of Onset    Lipids Father         elevated cholesterol    Genito-Urinary Disorder Father         BPH    Heart Disorder Father     Lipids Mother         elevated cholesterol    Heart Disorder Mother     Asthma Brother     Asthma Brother       Social History:   Social History     Socioeconomic History    Marital status:    Tobacco Use    Smoking status: Never    Smokeless tobacco: Never   Vaping Use    Vaping Use: Never used   Substance and Sexual Activity    Alcohol use: Yes     Comment: 1-2 beers a week    Drug use: No   Other Topics Concern    Caffeine Concern No    Second-hand smoke exposure Yes     Comment: Grew up in house with Mom and sisters smoking    Alcohol/drug concerns No    Violence concerns No        Medications (Active prior to today's visit):  No current outpatient medications on file.       Allergies:  Allergies   Allergen Reactions    Seasonal Coughing and Runny nose         Depression Screening (PHQ-2/PHQ-9): Over the LAST 2 WEEKS                         ROS:   Review of Systems   All  other systems reviewed and are negative.      PHYSICAL EXAM:     Vitals:    02/05/24 1147   BP: 130/80   BP Location: Right arm   Patient Position: Sitting   Cuff Size: adult   Pulse: 80   Resp: 19   SpO2: 98%   Weight: 196 lb (88.9 kg)   Height: 6' (1.829 m)     Physical Exam  Vitals reviewed.   Constitutional:       General: He is not in acute distress.  Cardiovascular:      Rate and Rhythm: Normal rate.   Pulmonary:      Effort: Pulmonary effort is normal. No respiratory distress.   Neurological:      Mental Status: He is alert.   Psychiatric:         Mood and Affect: Mood normal.         ASSESSMENT/PLAN:   (N28.9) Renal insufficiency  (primary encounter diagnosis)  Plan:   -Creatinine of 1.66, GFR 49.  Renal function repeated today.  Encouraged to continue hydrating adequately.        Responsible party/patient verbalized understanding of information discussed. No barriers to learning observed.          Orders This Visit:  No orders of the defined types were placed in this encounter.      Meds This Visit:  Requested Prescriptions      No prescriptions requested or ordered in this encounter       Imaging & Referrals:  None       The 21st Century cures Act makes medical notes like these available to patients in the interest of transparency.  However, be advised that this is a medical document.  It is intended as peer to peer communication.  It is written in medical language and may contain abbreviations or verbiage that are unfamiliar.  It may appear blunt or direct.  Medical documents are intended to carry relevant information, facts as evident, and the clinical opinion of the practitioner.      This note was created by [employer] voice recognition. Errors in content may be related to improper recognition by the system; efforts to review and correct have been done but errors may still exist. Please contact me with any questions.       2/5/2024  Chuck Sheppard MD

## 2024-04-19 NOTE — OPERATIVE REPORT
COLONOSCOPY REPORT    Gila Blackwood     3/15/1970 Age 48year old   PCP Gunjan Carvajal MD Endoscopist Chang Harry MD     Date of procedure: 20    Procedure: Colonoscopy w/cold biopsy, cold snare polypectomy, control of bleeding    Pre the patient who tolerated the procedure well. Complications: none. Findings:   1. Three polyp(s) noted as follows:      A. 3 mm polyp in the ascending colon; flat morphology; cold biopsy polypectomy and retrieved.       B. 10 mm polyp in the sigmoid no

## 2024-05-10 ENCOUNTER — OFFICE VISIT (OUTPATIENT)
Dept: FAMILY MEDICINE CLINIC | Facility: CLINIC | Age: 54
End: 2024-05-10
Payer: COMMERCIAL

## 2024-05-10 ENCOUNTER — LAB ENCOUNTER (OUTPATIENT)
Dept: LAB | Age: 54
End: 2024-05-10
Attending: FAMILY MEDICINE

## 2024-05-10 VITALS
HEIGHT: 72 IN | OXYGEN SATURATION: 98 % | SYSTOLIC BLOOD PRESSURE: 126 MMHG | WEIGHT: 197 LBS | HEART RATE: 66 BPM | DIASTOLIC BLOOD PRESSURE: 81 MMHG | BODY MASS INDEX: 26.68 KG/M2 | RESPIRATION RATE: 18 BRPM

## 2024-05-10 DIAGNOSIS — M25.472 BILATERAL SWELLING OF FEET AND ANKLES: Primary | ICD-10-CM

## 2024-05-10 DIAGNOSIS — F43.9 STRESS: ICD-10-CM

## 2024-05-10 DIAGNOSIS — R25.2 MUSCLE CRAMPING: ICD-10-CM

## 2024-05-10 DIAGNOSIS — M25.471 BILATERAL SWELLING OF FEET AND ANKLES: Primary | ICD-10-CM

## 2024-05-10 DIAGNOSIS — M25.474 BILATERAL SWELLING OF FEET AND ANKLES: Primary | ICD-10-CM

## 2024-05-10 DIAGNOSIS — M25.475 BILATERAL SWELLING OF FEET AND ANKLES: Primary | ICD-10-CM

## 2024-05-10 LAB
ANION GAP SERPL CALC-SCNC: 7 MMOL/L (ref 0–18)
BUN BLD-MCNC: 11 MG/DL (ref 9–23)
BUN/CREAT SERPL: 10.9 (ref 10–20)
CALCIUM BLD-MCNC: 9.4 MG/DL (ref 8.7–10.4)
CHLORIDE SERPL-SCNC: 110 MMOL/L (ref 98–112)
CO2 SERPL-SCNC: 27 MMOL/L (ref 21–32)
CREAT BLD-MCNC: 1.01 MG/DL
EGFRCR SERPLBLD CKD-EPI 2021: 88 ML/MIN/1.73M2 (ref 60–?)
FASTING STATUS PATIENT QL REPORTED: NO
GLUCOSE BLD-MCNC: 95 MG/DL (ref 70–99)
MAGNESIUM SERPL-MCNC: 2.3 MG/DL (ref 1.6–2.6)
OSMOLALITY SERPL CALC.SUM OF ELEC: 297 MOSM/KG (ref 275–295)
PHOSPHATE SERPL-MCNC: 3.4 MG/DL (ref 2.4–5.1)
POTASSIUM SERPL-SCNC: 3.8 MMOL/L (ref 3.5–5.1)
SODIUM SERPL-SCNC: 144 MMOL/L (ref 136–145)

## 2024-05-10 PROCEDURE — 84100 ASSAY OF PHOSPHORUS: CPT

## 2024-05-10 PROCEDURE — 99214 OFFICE O/P EST MOD 30 MIN: CPT | Performed by: FAMILY MEDICINE

## 2024-05-10 PROCEDURE — 36415 COLL VENOUS BLD VENIPUNCTURE: CPT

## 2024-05-10 PROCEDURE — 83735 ASSAY OF MAGNESIUM: CPT

## 2024-05-10 PROCEDURE — 80048 BASIC METABOLIC PNL TOTAL CA: CPT
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
## 2025-01-23 ENCOUNTER — OFFICE VISIT (OUTPATIENT)
Dept: FAMILY MEDICINE CLINIC | Facility: CLINIC | Age: 55
End: 2025-01-23
Payer: COMMERCIAL

## 2025-01-23 ENCOUNTER — LAB ENCOUNTER (OUTPATIENT)
Dept: LAB | Age: 55
End: 2025-01-23
Attending: FAMILY MEDICINE
Payer: COMMERCIAL

## 2025-01-23 VITALS
WEIGHT: 200 LBS | RESPIRATION RATE: 18 BRPM | BODY MASS INDEX: 27.09 KG/M2 | TEMPERATURE: 98 F | DIASTOLIC BLOOD PRESSURE: 75 MMHG | HEIGHT: 72 IN | HEART RATE: 89 BPM | OXYGEN SATURATION: 99 % | SYSTOLIC BLOOD PRESSURE: 130 MMHG

## 2025-01-23 DIAGNOSIS — Z00.00 ANNUAL PHYSICAL EXAM: Primary | ICD-10-CM

## 2025-01-23 DIAGNOSIS — M79.602 UPPER EXTREMITY PAIN, ANTERIOR, LEFT: ICD-10-CM

## 2025-01-23 LAB
ALBUMIN SERPL-MCNC: 4.9 G/DL (ref 3.2–4.8)
ALBUMIN/GLOB SERPL: 2.1 {RATIO} (ref 1–2)
ALP LIVER SERPL-CCNC: 73 U/L
ALT SERPL-CCNC: 49 U/L
ANION GAP SERPL CALC-SCNC: 8 MMOL/L (ref 0–18)
AST SERPL-CCNC: 25 U/L (ref ?–34)
BILIRUB SERPL-MCNC: 0.6 MG/DL (ref 0.3–1.2)
BUN BLD-MCNC: 15 MG/DL (ref 9–23)
BUN/CREAT SERPL: 15 (ref 10–20)
CALCIUM BLD-MCNC: 9.5 MG/DL (ref 8.7–10.4)
CHLORIDE SERPL-SCNC: 107 MMOL/L (ref 98–112)
CHOLEST SERPL-MCNC: 184 MG/DL (ref ?–200)
CO2 SERPL-SCNC: 26 MMOL/L (ref 21–32)
CREAT BLD-MCNC: 1 MG/DL
DEPRECATED RDW RBC AUTO: 39.4 FL (ref 35.1–46.3)
EGFRCR SERPLBLD CKD-EPI 2021: 89 ML/MIN/1.73M2 (ref 60–?)
ERYTHROCYTE [DISTWIDTH] IN BLOOD BY AUTOMATED COUNT: 12.5 % (ref 11–15)
FASTING PATIENT LIPID ANSWER: NO
FASTING STATUS PATIENT QL REPORTED: NO
GLOBULIN PLAS-MCNC: 2.3 G/DL (ref 2–3.5)
GLUCOSE BLD-MCNC: 89 MG/DL (ref 70–99)
HCT VFR BLD AUTO: 44.1 %
HDLC SERPL-MCNC: 61 MG/DL (ref 40–59)
HGB BLD-MCNC: 15.2 G/DL
LDLC SERPL CALC-MCNC: 103 MG/DL (ref ?–100)
MCH RBC QN AUTO: 29.7 PG (ref 26–34)
MCHC RBC AUTO-ENTMCNC: 34.5 G/DL (ref 31–37)
MCV RBC AUTO: 86.1 FL
NONHDLC SERPL-MCNC: 123 MG/DL (ref ?–130)
OSMOLALITY SERPL CALC.SUM OF ELEC: 292 MOSM/KG (ref 275–295)
PLATELET # BLD AUTO: 268 10(3)UL (ref 150–450)
POTASSIUM SERPL-SCNC: 3.7 MMOL/L (ref 3.5–5.1)
PROT SERPL-MCNC: 7.2 G/DL (ref 5.7–8.2)
RBC # BLD AUTO: 5.12 X10(6)UL
SODIUM SERPL-SCNC: 141 MMOL/L (ref 136–145)
TRIGL SERPL-MCNC: 115 MG/DL (ref 30–149)
TSI SER-ACNC: 2.24 UIU/ML (ref 0.55–4.78)
VLDLC SERPL CALC-MCNC: 19 MG/DL (ref 0–30)
WBC # BLD AUTO: 7.4 X10(3) UL (ref 4–11)

## 2025-01-23 PROCEDURE — 80061 LIPID PANEL: CPT | Performed by: FAMILY MEDICINE

## 2025-01-23 PROCEDURE — 85027 COMPLETE CBC AUTOMATED: CPT | Performed by: FAMILY MEDICINE

## 2025-01-23 PROCEDURE — 90677 PCV20 VACCINE IM: CPT | Performed by: FAMILY MEDICINE

## 2025-01-23 PROCEDURE — 99396 PREV VISIT EST AGE 40-64: CPT | Performed by: FAMILY MEDICINE

## 2025-01-23 PROCEDURE — 90472 IMMUNIZATION ADMIN EACH ADD: CPT | Performed by: FAMILY MEDICINE

## 2025-01-23 PROCEDURE — 80053 COMPREHEN METABOLIC PANEL: CPT | Performed by: FAMILY MEDICINE

## 2025-01-23 PROCEDURE — 84443 ASSAY THYROID STIM HORMONE: CPT | Performed by: FAMILY MEDICINE

## 2025-01-23 PROCEDURE — 90656 IIV3 VACC NO PRSV 0.5 ML IM: CPT | Performed by: FAMILY MEDICINE

## 2025-01-23 PROCEDURE — 90471 IMMUNIZATION ADMIN: CPT | Performed by: FAMILY MEDICINE

## 2025-01-23 PROCEDURE — 36415 COLL VENOUS BLD VENIPUNCTURE: CPT | Performed by: FAMILY MEDICINE

## 2025-01-23 NOTE — H&P
HPI:    Luis Enrique Bob is a 54 year old male presents to clinic for annual physical exam.  Overall, doing well.  No acute concerns.  For the past several months he has had left upper extremity pain.  Dull, around his bicep.  Denies known trauma or injury.  Has pain with certain movements.  Normal appetite.  Normal bowel movements and urination.  He does not exercise, plans to start.  Normal sleep habits.      HISTORY:  Past Medical History:    Allergic rhinitis    Have had this since childhood    Headache    Tubular adenoma of colon    repeat CLN in 2030    Visual impairment    Been wearing corrective lenses since 3rd grade.      Past Surgical History:   Procedure Laterality Date    Colonoscopy N/A 11/18/2020    Procedure: COLONOSCOPY;  Surgeon: LORY Benjamin MD;  Location: [facility] ENDOSCOPY    Colonoscopy N/A 12/8/2023    Procedure: COLONOSCOPY;  Surgeon: LORY Benjamin MD;  Location: [facility] MAIN OR    T&a  1977    T+A surgeries    Tonsillectomy        Family History   Problem Relation Age of Onset    Lipids Father         elevated cholesterol    Genito-Urinary Disorder Father         BPH    Heart Disorder Father     Lipids Mother         elevated cholesterol    Heart Disorder Mother     Asthma Brother     Asthma Brother       Social History:   Social History     Socioeconomic History    Marital status:    Tobacco Use    Smoking status: Never    Smokeless tobacco: Never   Vaping Use    Vaping status: Never Used   Substance and Sexual Activity    Alcohol use: Yes     Comment: 1-2 beers a week    Drug use: No   Other Topics Concern    Caffeine Concern No    Second-hand smoke exposure Yes     Comment: Grew up in house with Mom and sisters smoking    Alcohol/drug concerns No    Violence concerns No        Medications (Active prior to today's visit):  No current outpatient medications on file.       Allergies:  Allergies[1]      Depression Screening (PHQ-2/PHQ-9): Over the LAST 2 WEEKS   Little interest or  pleasure in doing things: Not at all    Feeling down, depressed, or hopeless: Not at all    PHQ-2 SCORE: 0           ROS:   Review of Systems   All other systems reviewed and are negative.      PHYSICAL EXAM:     Vitals:    01/23/25 1105   BP: 130/75   Pulse: 89   Resp: 18   Temp: 98 °F (36.7 °C)   TempSrc: Oral   SpO2: 99%   Weight: 200 lb (90.7 kg)   Height: 6' (1.829 m)     Physical Exam  Vitals reviewed.   Constitutional:       General: He is not in acute distress.  HENT:      Head: Normocephalic and atraumatic.      Right Ear: Tympanic membrane, ear canal and external ear normal.      Left Ear: Tympanic membrane, ear canal and external ear normal.      Nose: Nose normal.      Mouth/Throat:      Pharynx: Uvula midline.   Eyes:      Conjunctiva/sclera: Conjunctivae normal.      Pupils: Pupils are equal, round, and reactive to light.   Neck:      Thyroid: No thyromegaly.   Cardiovascular:      Rate and Rhythm: Normal rate and regular rhythm.      Heart sounds: Normal heart sounds. No murmur heard.  Pulmonary:      Effort: Pulmonary effort is normal. No respiratory distress.      Breath sounds: Normal breath sounds. No wheezing or rales.   Abdominal:      General: Bowel sounds are normal. There is no distension.      Palpations: Abdomen is soft.      Tenderness: There is no abdominal tenderness. There is no guarding or rebound.   Musculoskeletal:      Cervical back: Normal range of motion and neck supple.      Comments: Left upper extremity-no swelling or deformity, no point tenderness, normal strength, normal range of motion.   Lymphadenopathy:      Cervical: No cervical adenopathy.   Neurological:      Mental Status: He is alert.         ASSESSMENT/PLAN:   (Z00.00) Annual physical exam  (primary encounter diagnosis)  Plan: CBC, Platelet, No Differential [E], COMP         METABOLIC PANEL [49792] [Q], TSH W REFLEX TO         FREE T4 [79742][Q], LIPID PANEL [7600] [Q]  - Immunizations UTD   - Reinforced healthy  diet, lifestyle, and exercise.  - Past Medical/Social/Family histories reviewed  - Regular dental visits recommended   - Regular eye exams recommended     Health Maintenance   Topic Date Due    Colorectal Cancer Screening  12/08/2030          Follow up in 1 year or sooner if needed      (M79.602) Upper extremity pain, anterior, left  Plan:   -Advised gentle stretching and regular exercise.  If symptoms persist, can consider physical therapy referral.  Patient will follow-up as needed.             Responsible party/patient verbalized understanding of information discussed. No barriers to learning observed.            Orders This Visit:  Orders Placed This Encounter   Procedures    CBC, Platelet, No Differential [E]    COMP METABOLIC PANEL [22200] [Q]    TSH W REFLEX TO FREE T4 [40780][Q]    LIPID PANEL [9010] [Q]    Prevnar 20 (PCV20) [34615]    Fluzone trivalent vaccine, PF 0.5mL, 6mo+ (97099)       Meds This Visit:  Requested Prescriptions      No prescriptions requested or ordered in this encounter       Imaging & Referrals:  PCV20 VACCINE FOR INTRAMUSCULAR USE  INFLUENZA VACCINE, TRI, PRESERV FREE, 0.5 ML     Chaperone offered at visit today.     The 21st Century cures Act makes medical notes like these available to patients in the interest of transparency.  However, be advised that this is a medical document.  It is intended as peer to peer communication.  It is written in medical language and may contain abbreviations or verbiage that are unfamiliar.  It may appear blunt or direct.  Medical documents are intended to carry relevant information, facts as evident, and the clinical opinion of the practitioner.      This note was created by [employer] voice recognition. Errors in content may be related to improper recognition by the system; efforts to review and correct have been done but errors may still exist. Please contact me with any questions.       1/23/2025  Chuck Sheppard MD       [1]   Allergies  Allergen  Reactions    Seasonal Coughing and Runny nose

## 2025-03-10 ENCOUNTER — HOSPITAL ENCOUNTER (OUTPATIENT)
Age: 55
Discharge: HOME | End: 2025-03-10
Payer: COMMERCIAL

## 2025-03-10 VITALS
SYSTOLIC BLOOD PRESSURE: 131 MMHG | OXYGEN SATURATION: 99 % | DIASTOLIC BLOOD PRESSURE: 86 MMHG | RESPIRATION RATE: 20 BRPM | HEART RATE: 101 BPM | TEMPERATURE: 98 F

## 2025-03-10 DIAGNOSIS — R52 BODY ACHES: ICD-10-CM

## 2025-03-10 DIAGNOSIS — J06.9 UPPER RESPIRATORY TRACT INFECTION, UNSPECIFIED TYPE: Primary | ICD-10-CM

## 2025-03-10 LAB
POCT INFLUENZA A: NEGATIVE
POCT INFLUENZA B: NEGATIVE

## 2025-03-10 PROCEDURE — 87502 INFLUENZA DNA AMP PROBE: CPT | Performed by: NURSE PRACTITIONER

## 2025-03-10 PROCEDURE — 99203 OFFICE O/P NEW LOW 30 MIN: CPT | Performed by: NURSE PRACTITIONER

## 2025-03-10 RX ORDER — BENZONATATE 200 MG/1
200 CAPSULE ORAL 3 TIMES DAILY PRN
Qty: 15 CAPSULE | Refills: 0 | Status: SHIPPED | OUTPATIENT
Start: 2025-03-10

## 2025-03-10 NOTE — ED PROVIDER NOTES
Patient Seen in: [facility]      History     Chief Complaint   Patient presents with    Flu     I started feeling sick Friday night. A 100 temperature fever achy. Lightheaded. Started feeling better Sunday, no fever. But then last night fever cough, runny nose. I just want to figure out what I have - Entered by patient     Stated Complaint: Flu - I started feeling sick Friday night. A 100 temperature fever achy. Lighth*    Subjective:   HPI  Patient is an 54-year-old male that presents to the immediate care center today with concern for fever, chills, cough, congestion that started 3 days ago. Pt has had known flu exposure.  Pt. has been eating and drinking without difficulty.  He has had no shortness of air; no abdominal or back pain; no headache or dizziness.          Objective:     Past Medical History:    Allergic rhinitis    Have had this since childhood    Headache    Tubular adenoma of colon    repeat CLN in 2030    Visual impairment    Been wearing corrective lenses since 3rd grade.              Past Surgical History:   Procedure Laterality Date    Colonoscopy N/A 11/18/2020    Procedure: COLONOSCOPY;  Surgeon: LORY Benjamin MD;  Location: [facility] ENDOSCOPY    Colonoscopy N/A 12/8/2023    Procedure: COLONOSCOPY;  Surgeon: LORY Benjamin MD;  Location: [facility] MAIN OR    T&a  1977    T+A surgeries    Tonsillectomy                  Social History     Socioeconomic History    Marital status:    Tobacco Use    Smoking status: Never    Smokeless tobacco: Never   Vaping Use    Vaping status: Never Used   Substance and Sexual Activity    Alcohol use: Yes     Comment: 1-2 beers a week    Drug use: No   Other Topics Concern    Caffeine Concern No    Second-hand smoke exposure Yes     Comment: Grew up in house with Mom and sisters smoking    Alcohol/drug concerns No    Violence concerns No              Review of Systems   Constitutional:  Negative for appetite change, chills and fever.   HENT:   Positive for congestion and sinus pressure.    Respiratory:  Positive for cough.    Gastrointestinal:  Negative for abdominal pain.   Musculoskeletal:  Negative for arthralgias and myalgias.   Skin:  Negative for rash.   Neurological:  Negative for dizziness, weakness and headaches.       Positive for stated complaint: Flu - I started feeling sick Friday night. A 100 temperature fever achy. Lighth*  Other systems are as noted in HPI.  Constitutional and vital signs reviewed.      All other systems reviewed and negative except as noted above.    Physical Exam     ED Triage Vitals [03/10/25 1040]   /86   Pulse 101   Resp 20   Temp 98.2 °F (36.8 °C)   Temp src Oral   SpO2 99 %   O2 Device None (Room air)       Current Vitals:   Vital Signs  BP: 131/86  Pulse: 101  Resp: 20  Temp: 98.2 °F (36.8 °C)  Temp src: Oral    Oxygen Therapy  SpO2: 99 %  O2 Device: None (Room air)        Physical Exam  Vitals and nursing note reviewed.   Constitutional:       General: He is not in acute distress.     Appearance: He is not ill-appearing.   HENT:      Right Ear: Tympanic membrane and ear canal normal.      Left Ear: Tympanic membrane and ear canal normal.      Nose: Nose normal.   Eyes:      Conjunctiva/sclera: Conjunctivae normal.   Pulmonary:      Effort: Pulmonary effort is normal. No respiratory distress.      Breath sounds: Normal breath sounds.   Musculoskeletal:      Cervical back: Normal range of motion and neck supple.   Skin:     General: Skin is warm and dry.      Findings: No rash.   Neurological:      Mental Status: He is alert and oriented to person, place, and time.             ED Course     Labs Reviewed   POCT FLU TEST - Normal    Narrative:     This assay is a rapid molecular in vitro test utilizing nucleic acid amplification of influenza A and B viral RNA.                   MDM              Medical Decision Making  Differential diagnoses considered included, but are not exclusive of: bacterial vs viral  sinusitis, dehydration, pneumonia, influenza, Covid-19 infection, and other viral upper respiratory infection.       Problems Addressed:  Upper respiratory tract infection, unspecified type: self-limited or minor problem    Amount and/or Complexity of Data Reviewed  Labs:  Decision-making details documented in ED Course.    Risk  OTC drugs.  Prescription drug management.        Disposition and Plan     Clinical Impression:  1. Upper respiratory tract infection, unspecified type    2. Body aches         Disposition:  Discharge  3/10/2025 11:43 am    Follow-up:  Chuck Sheppard MD  [address] [place] [address]  [phone]      As needed          Medications Prescribed:  Current Discharge Medication List        START taking these medications    Details   benzonatate 200 MG Oral Cap Take 1 capsule (200 mg total) by mouth 3 (three) times daily as needed for cough.  Qty: 15 capsule, Refills: 0                 Supplementary Documentation:

## 2025-03-10 NOTE — ED INITIAL ASSESSMENT (HPI)
Pt here with complaints of body aches, cough, fever , fatigue and congestion that began 3 days ago, pt states he has an exposure to the flu, pt denies any sob

## 2025-04-10 ENCOUNTER — NURSE TRIAGE (OUTPATIENT)
Dept: FAMILY MEDICINE CLINIC | Facility: CLINIC | Age: 55
End: 2025-04-10

## 2025-04-10 NOTE — TELEPHONE ENCOUNTER
Action Requested: Summary for Provider     []  Critical Lab, Recommendations Needed  [] Need Additional Advice  []   FYI    []   Need Orders  [] Need Medications Sent to Pharmacy  []  Other     SUMMARY: states he had blood on the toilet paper while wiping today after a second bowel movement, no dizziness, abdominal pain, or blood in the toilet. Advised to monitor at home and call back if it gets worse. He verbalized understanding.     Reason for call: Blood In Stool  Onset: today     The patient stated he has had some blood in his stool starting this morning. He stated at first it was just a little and it was dark. Then last in the morning it was bright red. It was only on the toilet paper when he wiped. There was no blood in the toilet. He stated for his first movement he was straining but the second time he was not. No irritation or itching around his rectum. He states he was feeling a little light headedness after he was done having the bowel movement. He did not have any diarrhea, stool is brown in color. He states his first bowel movement was dark in color and the second was normal but with bright red blood on the toilet paper when he wiped. No clots, no blood in the toilet. No abdominal pain.     Advised him to continue to monitor and to call the office back if the bleeding gets worse or if he develops any symptoms. He verbalized understanding.         Reason for Disposition   Rectal bleeding is minimal (e.g., blood just on toilet paper, a few drops in toilet bowl)    Protocols used: Rectal Bleeding-A-OH

## 2025-04-28 ENCOUNTER — OFFICE VISIT (OUTPATIENT)
Facility: CLINIC | Age: 55
End: 2025-04-28
Payer: COMMERCIAL

## 2025-04-28 VITALS
SYSTOLIC BLOOD PRESSURE: 110 MMHG | RESPIRATION RATE: 18 BRPM | BODY MASS INDEX: 27 KG/M2 | OXYGEN SATURATION: 99 % | DIASTOLIC BLOOD PRESSURE: 70 MMHG | TEMPERATURE: 97 F | HEART RATE: 104 BPM | WEIGHT: 197 LBS

## 2025-04-28 DIAGNOSIS — J02.9 SORE THROAT: ICD-10-CM

## 2025-04-28 DIAGNOSIS — R50.9 FEVER, UNSPECIFIED FEVER CAUSE: ICD-10-CM

## 2025-04-28 DIAGNOSIS — R05.1 ACUTE COUGH: Primary | ICD-10-CM

## 2025-04-28 LAB
COVID19 BINAX NOW ANTIGEN: DETECTED
POCT LOT NUMBER: ABNORMAL

## 2025-04-28 PROCEDURE — 87637 SARSCOV2&INF A&B&RSV AMP PRB: CPT | Performed by: FAMILY MEDICINE

## 2025-04-28 PROCEDURE — 99203 OFFICE O/P NEW LOW 30 MIN: CPT | Performed by: FAMILY MEDICINE

## 2025-04-28 PROCEDURE — 3078F DIAST BP <80 MM HG: CPT | Performed by: FAMILY MEDICINE

## 2025-04-28 PROCEDURE — 3074F SYST BP LT 130 MM HG: CPT | Performed by: FAMILY MEDICINE

## 2025-04-28 NOTE — PROGRESS NOTES
Subjective:   Luis Enrique Bob is a 55 year old male who presents for Cough (Pt reports fatigue,sore throat, 102F fever and productive cough x4days ). Max 102 F noted 3 days ago. No fevers since. Patient did not take covid test at home.       History/Other:    Chief Complaint Reviewed and Verified  Nursing Notes Reviewed and   Verified  Tobacco Reviewed  Allergies Reviewed  Medications Reviewed    Problem List Reviewed  Medical History Reviewed  Surgical History   Reviewed  Family History Reviewed         Tobacco:  He has never smoked tobacco.    Current Medications[1]      Review of Systems:  Review of Systems   Constitutional:  Positive for fatigue and fever.   HENT:  Positive for sore throat.    Respiratory:  Positive for cough.    Cardiovascular: Negative.    Gastrointestinal: Negative.    Skin: Negative.    Neurological: Negative.          Objective:   /70 (BP Location: Right arm)   Pulse 104   Temp 97 °F (36.1 °C) (Temporal)   Resp 18   Wt 197 lb (89.4 kg)   SpO2 99%   BMI 26.72 kg/m²  Estimated body mass index is 26.72 kg/m² as calculated from the following:    Height as of 1/23/25: 6' (1.829 m).    Weight as of this encounter: 197 lb (89.4 kg).  Physical Exam  Vitals and nursing note reviewed.   Constitutional:       General: He is not in acute distress.     Appearance: Normal appearance.   HENT:      Head: Normocephalic and atraumatic.      Right Ear: Tympanic membrane normal. There is no impacted cerumen.      Left Ear: Tympanic membrane normal. There is no impacted cerumen.      Mouth/Throat:      Mouth: Mucous membranes are moist.      Pharynx: Oropharynx is clear. No oropharyngeal exudate or posterior oropharyngeal erythema.   Eyes:      General:         Right eye: No discharge.         Left eye: No discharge.      Comments: Extraocular eye movements grossly intact   Pulmonary:      Effort: Pulmonary effort is normal. No respiratory distress.      Breath sounds: No stridor.  Rhonchi present. No wheezing or rales.   Musculoskeletal:      Comments: Normal gait   Skin:     Findings: No rash.   Neurological:      General: No focal deficit present.      Mental Status: He is alert. Mental status is at baseline.   Psychiatric:         Mood and Affect: Mood normal.         Behavior: Behavior normal.           Assessment & Plan:   1. Acute cough (Primary)  2. Fever, unspecified fever cause  3. Sore throat  -     SARS-COV-22-ALINITY; Future; Expected date: 04/28/2025    Rapid COVID test in office very faintly positive.  Collected send out to see if can get more definitive positive versus negative.  In the meantime counseled patient to treat as if does have COVID.  Recommended previous CDC guidelines of an additional 1 to 2 days of isolation/work from home and to wear a mask for an additional week.  Also offered albuterol inhaler for cough.  Patient declines for now.  Recommended supportive care including tea, honey and humidifier        Return if symptoms worsen or fail to improve.    Umm Castañeda MD, 4/28/2025, 1:28 PM        [1]   Current Outpatient Medications   Medication Sig Dispense Refill    benzonatate 200 MG Oral Cap Take 1 capsule (200 mg total) by mouth 3 (three) times daily as needed for cough. (Patient not taking: Reported on 4/28/2025) 15 capsule 0

## 2025-04-29 LAB
FLUAV + FLUBV RNA SPEC NAA+PROBE: NOT DETECTED
FLUAV + FLUBV RNA SPEC NAA+PROBE: NOT DETECTED
RSV RNA SPEC NAA+PROBE: NOT DETECTED
SARS-COV-2 RNA RESP QL NAA+PROBE: NOT DETECTED

## (undated) DEVICE — TRAP 4 CPTR CHMBR N EZ INLN

## (undated) DEVICE — 35 ML SYRINGE REGULAR TIP: Brand: MONOJECT

## (undated) DEVICE — Device: Brand: CUSTOM PROCEDURE KIT

## (undated) DEVICE — CLIP LGT 11MM OPEN 2.8MM 235CM

## (undated) DEVICE — FORCEP RADIAL JAW 4

## (undated) DEVICE — SNARE ENDOSCOPIC 10MM ROUND

## (undated) DEVICE — LINE MNTR ADLT SET O2 INTMD

## (undated) DEVICE — MASK PROC W/VISOR ANTIGLARE

## (undated) DEVICE — MEDI-VAC NON-CONDUCTIVE SUCTION TUBING 6MM X 1.8M (6FT.) L: Brand: CARDINAL HEALTH

## (undated) NOTE — LETTER
[facility], [address], [facility]  [id number]  [facility] [id number] [phone]                11/29/20      [facility]  [address]   [address]  [address] [facility]        Dear Luz Grider,    I reviewed the pathology r

## (undated) NOTE — LETTER
8/2/2023    Jeniffer Camarena Lisbeth        [address]        [facility] [id number]            Dear Deven Rosario,      Our records indicate that you are due for an appointment for a Colonoscopy with Melonie Landeros MD. Our doctors are booking out about 3-5 months in advance for procedures. Please call our office to schedule a phone screening appointment to plan for the procedure(s). Your medical well-being is important to us. If your insurance requires a referral, please call your primary care office to request one.       Thank you,      The Physicians and Staff at [facility]